# Patient Record
Sex: FEMALE | Race: WHITE | NOT HISPANIC OR LATINO | Employment: FULL TIME | URBAN - NONMETROPOLITAN AREA
[De-identification: names, ages, dates, MRNs, and addresses within clinical notes are randomized per-mention and may not be internally consistent; named-entity substitution may affect disease eponyms.]

---

## 2018-11-26 ENCOUNTER — OCCUPATIONAL MEDICINE (OUTPATIENT)
Dept: URGENT CARE | Facility: PHYSICIAN GROUP | Age: 53
End: 2018-11-26

## 2018-11-26 VITALS
DIASTOLIC BLOOD PRESSURE: 60 MMHG | BODY MASS INDEX: 25.95 KG/M2 | RESPIRATION RATE: 16 BRPM | WEIGHT: 152 LBS | OXYGEN SATURATION: 94 % | HEIGHT: 64 IN | SYSTOLIC BLOOD PRESSURE: 118 MMHG | TEMPERATURE: 98.2 F | HEART RATE: 78 BPM

## 2018-11-26 DIAGNOSIS — Z02.1 PRE-EMPLOYMENT DRUG SCREENING: ICD-10-CM

## 2018-11-26 LAB
AMP AMPHETAMINE: NORMAL
COC COCAINE: NORMAL
INT CON NEG: NORMAL
INT CON POS: NORMAL
MET METHAMPHETAMINES: NORMAL
OPI OPIATES: NORMAL
PCP PHENCYCLIDINE: NORMAL
POC DRUG COMMENT 753798-OCCUPATIONAL HEALTH: NEGATIVE
THC: NORMAL

## 2018-11-26 PROCEDURE — 80305 DRUG TEST PRSMV DIR OPT OBS: CPT | Performed by: PHYSICIAN ASSISTANT

## 2018-11-26 PROCEDURE — 8915 PR COMPREHENSIVE PHYSICAL: Performed by: PHYSICIAN ASSISTANT

## 2018-11-26 RX ORDER — ARIPIPRAZOLE 5 MG/1
5 TABLET ORAL DAILY
COMMUNITY

## 2018-11-26 RX ORDER — LEVOTHYROXINE SODIUM 0.05 MG/1
50 TABLET ORAL
COMMUNITY

## 2018-11-26 RX ORDER — ALPRAZOLAM 0.5 MG/1
0.5 TABLET ORAL NIGHTLY PRN
COMMUNITY

## 2018-11-26 RX ORDER — DULOXETIN HYDROCHLORIDE 60 MG/1
60 CAPSULE, DELAYED RELEASE ORAL DAILY
COMMUNITY

## 2018-11-26 NOTE — PROGRESS NOTES
Patient is here for a preemployment physical and denies any issues at this time. All paperwork reviewed. Exam normal. Cleared for work.

## 2023-06-01 ENCOUNTER — APPOINTMENT (OUTPATIENT)
Dept: SLEEP MEDICINE | Facility: MEDICAL CENTER | Age: 58
End: 2023-06-01
Attending: PREVENTIVE MEDICINE
Payer: COMMERCIAL

## 2024-05-03 ENCOUNTER — APPOINTMENT (OUTPATIENT)
Dept: URGENT CARE | Facility: PHYSICIAN GROUP | Age: 59
End: 2024-05-03

## 2024-05-03 ENCOUNTER — OFFICE VISIT (OUTPATIENT)
Dept: URGENT CARE | Facility: PHYSICIAN GROUP | Age: 59
End: 2024-05-03
Payer: COMMERCIAL

## 2024-05-03 VITALS
SYSTOLIC BLOOD PRESSURE: 138 MMHG | WEIGHT: 187 LBS | TEMPERATURE: 98.4 F | BODY MASS INDEX: 31.92 KG/M2 | DIASTOLIC BLOOD PRESSURE: 84 MMHG | RESPIRATION RATE: 16 BRPM | OXYGEN SATURATION: 95 % | HEART RATE: 102 BPM | HEIGHT: 64 IN

## 2024-05-03 DIAGNOSIS — R21 RASH OF BOTH FEET: ICD-10-CM

## 2024-05-03 PROCEDURE — 3075F SYST BP GE 130 - 139MM HG: CPT | Performed by: PHYSICIAN ASSISTANT

## 2024-05-03 PROCEDURE — 99204 OFFICE O/P NEW MOD 45 MIN: CPT | Performed by: PHYSICIAN ASSISTANT

## 2024-05-03 PROCEDURE — 3079F DIAST BP 80-89 MM HG: CPT | Performed by: PHYSICIAN ASSISTANT

## 2024-05-03 RX ORDER — HYDROXYZINE HYDROCHLORIDE 25 MG/1
25 TABLET, FILM COATED ORAL 3 TIMES DAILY PRN
Qty: 30 TABLET | Refills: 0 | Status: SHIPPED | OUTPATIENT
Start: 2024-05-03

## 2024-05-03 RX ORDER — MONTELUKAST SODIUM 10 MG/1
TABLET ORAL
COMMUNITY

## 2024-05-03 RX ORDER — PREGABALIN 75 MG/1
CAPSULE ORAL
COMMUNITY
Start: 2024-04-19

## 2024-05-03 RX ORDER — TRIAMCINOLONE ACETONIDE 40 MG/ML
40 INJECTION, SUSPENSION INTRA-ARTICULAR; INTRAMUSCULAR ONCE
Status: COMPLETED | OUTPATIENT
Start: 2024-05-03 | End: 2024-05-03

## 2024-05-03 RX ADMIN — TRIAMCINOLONE ACETONIDE 40 MG: 40 INJECTION, SUSPENSION INTRA-ARTICULAR; INTRAMUSCULAR at 13:55

## 2024-05-03 ASSESSMENT — ENCOUNTER SYMPTOMS
RHINORRHEA: 0
SHORTNESS OF BREATH: 0
GASTROINTESTINAL NEGATIVE: 1
SORE THROAT: 0
DIARRHEA: 0
CARDIOVASCULAR NEGATIVE: 1
CONSTITUTIONAL NEGATIVE: 1
FEVER: 0
RESPIRATORY NEGATIVE: 1
NEUROLOGICAL NEGATIVE: 1
VOMITING: 0
FATIGUE: 0

## 2024-05-03 NOTE — PROGRESS NOTES
Subjective     Jennifer Gomez is a very pleasant 58 y.o. female who presents with Rash (X 1 week on bilateral ankle.  They itch.)            Rash  This is a new problem. The current episode started 1 to 4 weeks ago. The problem has been gradually worsening since onset. The affected locations include the left ankle and right ankle. The rash is characterized by itchiness. It is unknown if there was an exposure to a precipitant. Pertinent negatives include no congestion, diarrhea, facial edema, fatigue, fever, joint pain, rhinorrhea, shortness of breath, sore throat or vomiting. Past treatments include antihistamine and topical steroids. The treatment provided mild relief. There is no history of allergies or eczema.         PMH:  has no past medical history on file.  MEDS:   Current Outpatient Medications:     montelukast (SINGULAIR) 10 MG Tab, TAKE 1 TABLET BY MOUTH ONCE DAILY AT BEDTIME FOR CHRONIC NASAL CONGESTION, Disp: , Rfl:     pregabalin (LYRICA) 75 MG Cap, TAKE 1 CAPSULE BY MOUTH TWICE DAILY FOR 30 DAYS FOR FIBROMYALGIA PAIN., Disp: , Rfl:     levothyroxine (SYNTHROID) 50 MCG Tab, Take 50 mcg by mouth Every morning on an empty stomach., Disp: , Rfl:     DULoxetine (CYMBALTA) 60 MG Cap DR Particles delayed-release capsule, Take 60 mg by mouth every day., Disp: , Rfl:     Current Facility-Administered Medications:     triamcinolone acetonide (Kenalog-40) injection 40 mg, 40 mg, Intramuscular, Once, Larry Rodriguez PJoA.-LIDIA.  ALLERGIES:   Allergies   Allergen Reactions    Prozac [Fluoxetine] Hives     SURGHX: No past surgical history on file.  SOCHX:  reports that she has been smoking. She has never used smokeless tobacco.  FH: family history is not on file.      Review of Systems   Constitutional: Negative.  Negative for fatigue and fever.   HENT:  Negative for congestion, rhinorrhea and sore throat.    Respiratory: Negative.  Negative for shortness of breath.    Cardiovascular: Negative.    Gastrointestinal:  "Negative.  Negative for diarrhea and vomiting.   Musculoskeletal:  Negative for joint pain.   Skin:  Positive for itching and rash.   Neurological: Negative.        Medications, Allergies, and current problem list reviewed today in Epic           Objective     /84   Pulse (!) 102   Temp 36.9 °C (98.4 °F) (Temporal)   Resp 16   Ht 1.626 m (5' 4\")   Wt 84.8 kg (187 lb)   SpO2 95%   BMI 32.10 kg/m²      Physical Exam  Vitals and nursing note reviewed.   Constitutional:       General: She is not in acute distress.     Appearance: Normal appearance. She is well-developed. She is not ill-appearing, toxic-appearing or diaphoretic.   HENT:      Head: Normocephalic and atraumatic.      Right Ear: Hearing and external ear normal.      Left Ear: Hearing and external ear normal.      Nose: Nose normal.   Eyes:      General:         Right eye: No discharge.         Left eye: No discharge.      Conjunctiva/sclera: Conjunctivae normal.   Cardiovascular:      Rate and Rhythm: Normal rate and regular rhythm.      Heart sounds: Normal heart sounds.   Pulmonary:      Effort: Pulmonary effort is normal. No respiratory distress.      Breath sounds: Normal breath sounds. No wheezing.   Musculoskeletal:      Cervical back: Normal range of motion and neck supple.   Skin:     General: Skin is warm and dry.      Findings: Rash present.      Comments: Raised, erythematous macular rash well-demarcated border with a brownish smooth flat center of the bilateral ankles.  No open lesions, blisters, vesicles, fluctuance or discharge.  No scaling or dryness.  No joint pain.  Distal neurovascular intact.   Neurological:      General: No focal deficit present.      Mental Status: She is alert and oriented to person, place, and time.   Psychiatric:         Mood and Affect: Mood normal.         Behavior: Behavior normal.         Thought Content: Thought content normal.         Judgment: Judgment normal.                       "       Assessment & Plan        1. Rash of both feet  triamcinolone acetonide (Kenalog-40) injection 40 mg    Referral to Dermatology    hydrOXYzine HCl (ATARAX) 25 MG Tab        This is a very pleasant 58-year-old female presenting with raised erythematous large macular lesions on her bilateral ankles.  There is a well-demarcated erythematous border with a brownish smooth flat center.  There is no open lesions, blisters, vesicles, fluctuance, discharge, scaling or dryness.  Rash is described as mildly pruritic.  No red streaking, joint pain or systemic symptoms.  Patient is neurovascularly intact.  Unclear of exact etiology. 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  Difficult to treat from the urgent care standpoint.  Kenalog IM and Atarax for symptomatic relief.  Urgent referral to dermatology placed.      I personally reviewed prior external notes and test results pertinent to today's visit. Return to clinic or go to ED if symptoms worsen or persist. Red flag symptoms and indications for ED discussed at length. Patient/Parent/Guardian voices understanding.  AVS with post-visit instructions printed and provided or given verbally.  Follow-up with your primary care provider in 3-5 days. All side effects and potential interactions of prescribed medication discussed including allergic response, GI upset, tendon injury, rash, sedation, OCP effectiveness, etc.    Please note that this dictation was created using voice recognition software. I have made every reasonable attempt to correct obvious errors, but I expect that there are errors of grammar and possibly content that I did not discover before finalizing the note.

## 2024-09-23 ENCOUNTER — OFFICE VISIT (OUTPATIENT)
Dept: URGENT CARE | Facility: PHYSICIAN GROUP | Age: 59
End: 2024-09-23
Payer: COMMERCIAL

## 2024-09-23 VITALS
BODY MASS INDEX: 31.24 KG/M2 | DIASTOLIC BLOOD PRESSURE: 82 MMHG | SYSTOLIC BLOOD PRESSURE: 128 MMHG | HEART RATE: 90 BPM | OXYGEN SATURATION: 93 % | HEIGHT: 64 IN | WEIGHT: 183 LBS | RESPIRATION RATE: 16 BRPM | TEMPERATURE: 99 F

## 2024-09-23 DIAGNOSIS — R11.2 NAUSEA AND VOMITING, UNSPECIFIED VOMITING TYPE: ICD-10-CM

## 2024-09-23 DIAGNOSIS — R68.89 FLU-LIKE SYMPTOMS: ICD-10-CM

## 2024-09-23 DIAGNOSIS — U07.1 COVID-19: Primary | ICD-10-CM

## 2024-09-23 DIAGNOSIS — R10.31 RIGHT LOWER QUADRANT ABDOMINAL PAIN: ICD-10-CM

## 2024-09-23 LAB
FLUAV RNA SPEC QL NAA+PROBE: NEGATIVE
FLUBV RNA SPEC QL NAA+PROBE: NEGATIVE
RSV RNA SPEC QL NAA+PROBE: NEGATIVE
SARS-COV-2 RNA RESP QL NAA+PROBE: POSITIVE

## 2024-09-23 PROCEDURE — 0241U POCT CEPHEID COV-2, FLU A/B, RSV - PCR: CPT | Performed by: NURSE PRACTITIONER

## 2024-09-23 PROCEDURE — 3079F DIAST BP 80-89 MM HG: CPT | Performed by: NURSE PRACTITIONER

## 2024-09-23 PROCEDURE — 3074F SYST BP LT 130 MM HG: CPT | Performed by: NURSE PRACTITIONER

## 2024-09-23 PROCEDURE — 99213 OFFICE O/P EST LOW 20 MIN: CPT | Performed by: NURSE PRACTITIONER

## 2024-09-23 RX ORDER — DEXTROMETHORPHAN HYDROBROMIDE AND PROMETHAZINE HYDROCHLORIDE 15; 6.25 MG/5ML; MG/5ML
5 SYRUP ORAL EVERY 4 HOURS PRN
Qty: 120 ML | Refills: 0 | Status: SHIPPED | OUTPATIENT
Start: 2024-09-23 | End: 2024-09-30

## 2024-09-23 RX ORDER — ONDANSETRON 4 MG/1
4 TABLET, ORALLY DISINTEGRATING ORAL ONCE
Status: COMPLETED | OUTPATIENT
Start: 2024-09-23 | End: 2024-09-23

## 2024-09-23 RX ORDER — QUETIAPINE FUMARATE 25 MG/1
TABLET, FILM COATED ORAL
COMMUNITY

## 2024-09-23 RX ADMIN — ONDANSETRON 4 MG: 4 TABLET, ORALLY DISINTEGRATING ORAL at 13:43

## 2024-09-23 NOTE — PROGRESS NOTES
"Subjective:     Jennifer Gomez is a 59 y.o. female who presents for Body Aches (X 4 days with headache, nausea, vomiting, bilateral red eyes, diarrhea, cough, congestion, fever, itching hands and feet and chills. )      HPI  Pt presents for evaluation of a new problem. Jennifer is a pleasant 59-year-old female presents urgent care today with flulike symptoms that started 3 days ago her symptoms have progressively worsened and she endorses a severe cough, nausea/vomiting, headache, body aches, sore throat, congestion and fatigue.  She also endorses right lower quadrant abdominal pain that initially started yesterday but rapidly worsened today.  She is unable to eat and ambulate due to her discomfort and persistent nausea and vomiting.  She denies any diarrhea.  She has been using ibuprofen for her symptoms which has provided very little relief.  No known ill contacts.    ROS    PMH: No past medical history on file.  ALLERGIES:   Allergies   Allergen Reactions    Prozac [Fluoxetine] Hives     SURGHX: No past surgical history on file.  SOCHX:   Social History     Socioeconomic History    Marital status:    Tobacco Use    Smoking status: Every Day    Smokeless tobacco: Never     FH: No family history on file.      Objective:   /82   Pulse 90   Temp 37.2 °C (99 °F) (Temporal)   Resp 16   Ht 1.626 m (5' 4\")   Wt 83 kg (183 lb)   SpO2 93%   BMI 31.41 kg/m²     Physical Exam  Vitals and nursing note reviewed.   Constitutional:       General: She is not in acute distress.     Appearance: Normal appearance. She is normal weight. She is not ill-appearing or toxic-appearing.   HENT:      Head: Normocephalic.      Right Ear: Tympanic membrane, ear canal and external ear normal.      Left Ear: Tympanic membrane, ear canal and external ear normal.      Nose: Congestion present. No rhinorrhea.      Mouth/Throat:      Mouth: Mucous membranes are moist.      Pharynx: Posterior oropharyngeal erythema present. No " oropharyngeal exudate.   Eyes:      General:         Right eye: No discharge.         Left eye: No discharge.      Pupils: Pupils are equal, round, and reactive to light.   Cardiovascular:      Rate and Rhythm: Normal rate and regular rhythm.      Pulses: Normal pulses.      Heart sounds: Normal heart sounds.   Pulmonary:      Effort: Pulmonary effort is normal.   Abdominal:      General: Abdomen is flat.      Tenderness: There is abdominal tenderness in the right lower quadrant and periumbilical area. There is guarding.   Musculoskeletal:         General: Normal range of motion.      Cervical back: Normal range of motion and neck supple.   Skin:     General: Skin is dry.   Neurological:      General: No focal deficit present.      Mental Status: She is alert and oriented to person, place, and time. Mental status is at baseline.   Psychiatric:         Mood and Affect: Mood normal.         Behavior: Behavior normal.         Thought Content: Thought content normal.         Judgment: Judgment normal.           Assessment/Plan:   Assessment    1. COVID-19  Nirmatrelvir&Ritonavir 300/100 20 x 150 MG & 10 x 100MG Tablet Therapy Pack      2. Flu-like symptoms  POCT CoV-2, Flu A/B, RSV by PCR    promethazine-dextromethorphan (PROMETHAZINE-DM) 6.25-15 MG/5ML syrup      3. Right lower quadrant abdominal pain        4. Nausea and vomiting, unspecified vomiting type  ondansetron (Zofran ODT) dispertab 4 mg        Jennifer did test positive for COVID-19 in the clinic today.  Her symptoms are severe and due to this Paxlovid was initiated.  She is still within the timeframe for antiviral treatment.  Possible interaction with Seroquel and duloxetine.  Warning placed on label.  She was also prescribed promethazine/dextromethorphan for relief of severe cough.  Patient is also experiencing severe right lower quadrant abdominal pain, nausea or vomiting.  Her pain is worsened since yesterday.  It is possible that this may be related to  viral illness however, there is also concern for appendicitis.  I did offer to order CT scan stat however, due to patient's status and worsening symptoms they would like to be seen in emergency room at [place].  I am in agreement with this plan of care.  Patient is safe for self transportation.

## 2024-09-23 NOTE — LETTER
September 23, 2024    To Whom It May Concern:         This is confirmation that Jennifer Gomez attended her scheduled appointment with SOO Ramírez on 9/23/24. Please excuse her absence starting 9/20/2024 due to an acute illness. She may return to work on 9/30/2024 or sooner if better.          If you have any questions please do not hesitate to call me at the phone number listed below.    Sincerely,          LORENE Ramírez.  [phone]